# Patient Record
Sex: FEMALE | Race: WHITE | NOT HISPANIC OR LATINO | Employment: FULL TIME | URBAN - METROPOLITAN AREA
[De-identification: names, ages, dates, MRNs, and addresses within clinical notes are randomized per-mention and may not be internally consistent; named-entity substitution may affect disease eponyms.]

---

## 2021-12-08 ENCOUNTER — OFFICE VISIT (OUTPATIENT)
Dept: URGENT CARE | Facility: CLINIC | Age: 46
End: 2021-12-08
Payer: COMMERCIAL

## 2021-12-08 VITALS
DIASTOLIC BLOOD PRESSURE: 82 MMHG | BODY MASS INDEX: 23.92 KG/M2 | TEMPERATURE: 97.8 F | OXYGEN SATURATION: 99 % | SYSTOLIC BLOOD PRESSURE: 122 MMHG | RESPIRATION RATE: 16 BRPM | HEART RATE: 74 BPM | HEIGHT: 62 IN | WEIGHT: 130 LBS

## 2021-12-08 DIAGNOSIS — S29.019A ACUTE THORACIC MYOFASCIAL STRAIN, INITIAL ENCOUNTER: Primary | ICD-10-CM

## 2021-12-08 DIAGNOSIS — M62.830 SPASM OF THORACIC BACK MUSCLE: ICD-10-CM

## 2021-12-08 DIAGNOSIS — M62.838 CERVICAL PARASPINAL MUSCLE SPASM: ICD-10-CM

## 2021-12-08 PROCEDURE — 99203 OFFICE O/P NEW LOW 30 MIN: CPT | Performed by: PHYSICIAN ASSISTANT

## 2021-12-08 RX ORDER — CYCLOBENZAPRINE HCL 10 MG
10 TABLET ORAL 3 TIMES DAILY PRN
Qty: 30 TABLET | Refills: 0 | Status: SHIPPED | OUTPATIENT
Start: 2021-12-08 | End: 2021-12-23 | Stop reason: SDUPTHER

## 2021-12-08 RX ORDER — TRAZODONE HYDROCHLORIDE 50 MG/1
50 TABLET ORAL NIGHTLY
COMMUNITY
End: 2022-12-13 | Stop reason: SDUPTHER

## 2021-12-08 RX ORDER — PREDNISONE 20 MG/1
20 TABLET ORAL 2 TIMES DAILY
Qty: 14 TABLET | Refills: 0 | Status: SHIPPED | OUTPATIENT
Start: 2021-12-08 | End: 2021-12-15

## 2021-12-08 ASSESSMENT — ENCOUNTER SYMPTOMS
FOCAL WEAKNESS: 0
PARESTHESIAS: 1
BOWEL INCONTINENCE: 0
MYALGIAS: 1
HEADACHES: 0
SENSORY CHANGE: 0
BACK PAIN: 1
TINGLING: 1
ABDOMINAL PAIN: 0
FEVER: 0
NECK PAIN: 1

## 2021-12-09 NOTE — PROGRESS NOTES
Subjective     Tari Adam is a 46 y.o. female who presents with Back Pain    Medications:    • CYMBALTA PO  • SYMBICORT INH  • traZODone Tabs    Allergies: Patient has no known allergies.    Problem List: Tari Adam does not have a problem list on file.    Surgical History:  No past surgical history on file.    Past Social Hx: Tari Adam  reports that she has never smoked. She has never used smokeless tobacco.     Past Family Hx:  Tari Adam family history is not on file.     Problem list, medications, and allergies reviewed by myself today in Epic.          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  PT states she felt pain at the time but has gotten progressively worse over the last 8. Days.  Injury occurred 11/30/2021.  Pt has taken otc nsaids and flexeril (she had some at home) with some improvement but very little.  Pt has some occasional paresthesia to bilateral upper extremities with certain movements.  Pt has informed her supervisor at the [facility] of this injury.  Pt was instructed to be seen, UC was ok, and then to have paperwork submitted to supervisor.  No previous back injury in the past.     PT refused work comp pathway,stated she was instructed to be seen in UC as a regular patient, not work comp.       Back Pain  This is a new problem. The current episode started 1 to 4 weeks ago. The problem occurs constantly. The problem is unchanged. The pain is present in the thoracic spine. The quality of the pain is described as burning and cramping. The pain is at a severity of 4/10. The pain is moderate. The pain is the same all the time. The symptoms are aggravated by bending, position, standing and twisting. Stiffness is present all day. Associated symptoms include paresthesias and tingling. Pertinent negatives include no abdominal pain, bladder incontinence, bowel incontinence, fever or headaches. She has  "tried heat, home exercises, NSAIDs and muscle relaxant for the symptoms. The treatment provided no relief.       Review of Systems   Constitutional: Negative for fever.   Gastrointestinal: Negative for abdominal pain and bowel incontinence.   Genitourinary: Negative for bladder incontinence.   Musculoskeletal: Positive for back pain, myalgias and neck pain.   Neurological: Positive for tingling and paresthesias. Negative for sensory change, focal weakness and headaches.   All other systems reviewed and are negative.             Objective     /82 (BP Location: Left arm, Patient Position: Sitting, BP Cuff Size: Adult)   Pulse 74   Temp 36.6 °C (97.8 °F) (Temporal)   Resp 16   Ht 1.575 m (5' 2\")   Wt 59 kg (130 lb)   SpO2 99%   BMI 23.78 kg/m²      Physical Exam  Vitals and nursing note reviewed.   Constitutional:       General: She is not in acute distress.     Appearance: Normal appearance. She is well-developed and normal weight. She is not ill-appearing or toxic-appearing.   HENT:      Head: Normocephalic and atraumatic.      Right Ear: Tympanic membrane normal.      Left Ear: Tympanic membrane normal.      Nose: Nose normal.      Mouth/Throat:      Lips: Pink.      Mouth: Mucous membranes are moist.      Pharynx: Oropharynx is clear. Uvula midline.   Eyes:      Extraocular Movements: Extraocular movements intact.      Conjunctiva/sclera: Conjunctivae normal.      Pupils: Pupils are equal, round, and reactive to light.   Cardiovascular:      Rate and Rhythm: Normal rate and regular rhythm.      Pulses: Normal pulses.      Heart sounds: Normal heart sounds.   Pulmonary:      Effort: Pulmonary effort is normal.      Breath sounds: Normal breath sounds.   Abdominal:      General: Bowel sounds are normal.      Palpations: Abdomen is soft.      Tenderness: There is no guarding.   Musculoskeletal:      Cervical back: Neck supple. Spasms and tenderness present. No bony tenderness. Pain with movement present. " Decreased range of motion.      Thoracic back: Spasms and tenderness present. No bony tenderness. Decreased range of motion.        Back:    Skin:     General: Skin is warm and dry.      Capillary Refill: Capillary refill takes less than 2 seconds.   Neurological:      General: No focal deficit present.      Mental Status: She is alert and oriented to person, place, and time.      Cranial Nerves: No cranial nerve deficit.      Motor: Motor function is intact. No abnormal muscle tone.      Coordination: Coordination is intact. Coordination normal.      Gait: Gait normal.      Deep Tendon Reflexes: Reflexes are normal and symmetric.   Psychiatric:         Mood and Affect: Mood normal.         Behavior: Behavior is cooperative.                   Assessment & Plan        1. Acute thoracic myofascial strain, initial encounter  cyclobenzaprine (FLEXERIL) 10 mg Tab    predniSONE (DELTASONE) 20 MG Tab   2. Spasm of thoracic back muscle  cyclobenzaprine (FLEXERIL) 10 mg Tab    predniSONE (DELTASONE) 20 MG Tab   3. Cervical paraspinal muscle spasm  cyclobenzaprine (FLEXERIL) 10 mg Tab    predniSONE (DELTASONE) 20 MG Tab     Patient was evaluated in clinic today while wearing appropriate personal protective equipment.      PT to begin prescription medications today as discussed.  PT instructed not to drive or operate heavy machinery or drink alcohol while taking this medication because it contains either a narcotic, benzodiazepines or muscle relaxant which causes drowsiness. PT verbalized understanding of these instructions.     [facility] Aware web site evaluation: I have obtained and reviewed patient utilization report from [facility] pharmacy database prior to writing prescription for controlled substance.  No history of abuse.    PT should follow up with PCP in 1-2 days for re-evaluation if symptoms have not improved.      Discussed red flags and reasons to return to  or ED.      Pt and/or family verbalized  understanding of diagnosis and follow up instructions and was offered informational handout on diagnosis.  PT discharged.

## 2021-12-13 ENCOUNTER — OFFICE VISIT (OUTPATIENT)
Dept: URGENT CARE | Facility: CLINIC | Age: 46
End: 2021-12-13
Payer: COMMERCIAL

## 2021-12-13 VITALS
DIASTOLIC BLOOD PRESSURE: 70 MMHG | HEART RATE: 79 BPM | BODY MASS INDEX: 23.92 KG/M2 | HEIGHT: 62 IN | SYSTOLIC BLOOD PRESSURE: 110 MMHG | RESPIRATION RATE: 16 BRPM | TEMPERATURE: 98.9 F | WEIGHT: 130 LBS | OXYGEN SATURATION: 98 %

## 2021-12-13 DIAGNOSIS — B37.9 YEAST INFECTION: ICD-10-CM

## 2021-12-13 PROCEDURE — 99213 OFFICE O/P EST LOW 20 MIN: CPT | Performed by: NURSE PRACTITIONER

## 2021-12-13 RX ORDER — FLUCONAZOLE 150 MG/1
150 TABLET ORAL
Qty: 2 TABLET | Refills: 0 | Status: SHIPPED | OUTPATIENT
Start: 2021-12-13 | End: 2021-12-17

## 2021-12-13 ASSESSMENT — ENCOUNTER SYMPTOMS
CHILLS: 0
FEVER: 0
FLANK PAIN: 0

## 2021-12-13 NOTE — PROGRESS NOTES
"Subjective     Tari Adam is a 46 y.o. female who presents with Vaginitis (took a 1 day monostat help but not all the way)            Tari comes in today with a 3-5 day history of vaginal itching with thick white vaginal discharge.  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  She took OTC monistat and symptoms improved but did not resolve.  She denies any fever, chills, myalgias, dysuria, flank pain, pelvic pain, genital rash or lesion.  No suspected STI.  Status post hysterectomy.        Review of Systems   Constitutional: Negative for chills, fever and malaise/fatigue.   Genitourinary: Negative for dysuria, flank pain, frequency, hematuria and urgency.   Skin: Positive for itching.     Medications, Allergies, and current problem list reviewed today in Epic         Objective     Blood Pressure 110/70   Pulse 79   Temperature 37.2 °C (98.9 °F) (Temporal)   Respiration 16   Height 1.575 m (5' 2\")   Weight 59 kg (130 lb)   Oxygen Saturation 98%   Body Mass Index 23.78 kg/m²      Physical Exam  Vitals reviewed.   Constitutional:       General: She is not in acute distress.     Appearance: Normal appearance. She is not ill-appearing.   Eyes:      General: No scleral icterus.  Cardiovascular:      Rate and Rhythm: Normal rate and regular rhythm.      Heart sounds: Normal heart sounds. No murmur heard.  No friction rub. No gallop.    Pulmonary:      Effort: Pulmonary effort is normal. No respiratory distress.      Breath sounds: Normal breath sounds. No stridor. No wheezing, rhonchi or rales.   Chest:      Chest wall: No tenderness.   Abdominal:      General: Abdomen is flat. There is no distension.      Tenderness: There is no abdominal tenderness.   Genitourinary:     Comments: Tari declined pelvic exam or vaginal pathogens testing at this time.    Skin:     General: Skin is warm and dry.      Coloration: Skin is not jaundiced or pale.   Neurological:      Mental Status: She is " alert and oriented to person, place, and time.   Psychiatric:         Mood and Affect: Mood normal.                             Assessment & Plan        1. Yeast infection  - fluconazole (DIFLUCAN) 150 MG tablet; Take 1 Tablet by mouth every 3 days for 2 doses.  Dispense: 2 Tablet; Refill: 0    Discussed exam findings with Tari.  Will treat for presumptive yeast infection. Differential reviewed.  Diflucan as prescribed.  Maintain adequate po hydration.  Follow up in 1 week if symptoms persist sooner if worse.  She verbalized understanding of and agreed with plan of care.

## 2021-12-23 ENCOUNTER — OFFICE VISIT (OUTPATIENT)
Dept: MEDICAL GROUP | Facility: PHYSICIAN GROUP | Age: 46
End: 2021-12-23
Payer: COMMERCIAL

## 2021-12-23 ENCOUNTER — TELEPHONE (OUTPATIENT)
Dept: MEDICAL GROUP | Facility: PHYSICIAN GROUP | Age: 46
End: 2021-12-23

## 2021-12-23 VITALS
RESPIRATION RATE: 16 BRPM | BODY MASS INDEX: 24.18 KG/M2 | WEIGHT: 131.4 LBS | OXYGEN SATURATION: 98 % | SYSTOLIC BLOOD PRESSURE: 118 MMHG | HEART RATE: 73 BPM | DIASTOLIC BLOOD PRESSURE: 60 MMHG | TEMPERATURE: 98.3 F | HEIGHT: 62 IN

## 2021-12-23 DIAGNOSIS — S29.019D ACUTE THORACIC MYOFASCIAL STRAIN, SUBSEQUENT ENCOUNTER: ICD-10-CM

## 2021-12-23 DIAGNOSIS — J45.40 MODERATE PERSISTENT REACTIVE AIRWAY DISEASE WITHOUT COMPLICATION: ICD-10-CM

## 2021-12-23 DIAGNOSIS — Z90.2 HISTORY OF LOBECTOMY OF LUNG: ICD-10-CM

## 2021-12-23 DIAGNOSIS — Z12.31 ENCOUNTER FOR SCREENING MAMMOGRAM FOR MALIGNANT NEOPLASM OF BREAST: ICD-10-CM

## 2021-12-23 DIAGNOSIS — Z12.11 SCREENING FOR COLON CANCER: ICD-10-CM

## 2021-12-23 DIAGNOSIS — Z86.19 HISTORY OF COLD SORES: ICD-10-CM

## 2021-12-23 DIAGNOSIS — Z23 NEED FOR VACCINATION: ICD-10-CM

## 2021-12-23 DIAGNOSIS — F33.41 RECURRENT MAJOR DEPRESSIVE DISORDER, IN PARTIAL REMISSION (HCC): ICD-10-CM

## 2021-12-23 DIAGNOSIS — M62.838 CERVICAL PARASPINAL MUSCLE SPASM: ICD-10-CM

## 2021-12-23 DIAGNOSIS — M62.830 SPASM OF THORACIC BACK MUSCLE: ICD-10-CM

## 2021-12-23 DIAGNOSIS — Z00.00 HEALTHCARE MAINTENANCE: ICD-10-CM

## 2021-12-23 PROBLEM — J45.909 REACTIVE AIRWAY DISEASE WITHOUT COMPLICATION: Status: ACTIVE | Noted: 2021-12-23

## 2021-12-23 PROCEDURE — 99214 OFFICE O/P EST MOD 30 MIN: CPT | Mod: 25 | Performed by: STUDENT IN AN ORGANIZED HEALTH CARE EDUCATION/TRAINING PROGRAM

## 2021-12-23 PROCEDURE — 90471 IMMUNIZATION ADMIN: CPT | Performed by: STUDENT IN AN ORGANIZED HEALTH CARE EDUCATION/TRAINING PROGRAM

## 2021-12-23 PROCEDURE — 90732 PPSV23 VACC 2 YRS+ SUBQ/IM: CPT | Performed by: STUDENT IN AN ORGANIZED HEALTH CARE EDUCATION/TRAINING PROGRAM

## 2021-12-23 RX ORDER — DULOXETIN HYDROCHLORIDE 20 MG/1
40 CAPSULE, DELAYED RELEASE ORAL DAILY
Qty: 180 CAPSULE | Refills: 3 | Status: SHIPPED | OUTPATIENT
Start: 2021-12-23 | End: 2022-08-08 | Stop reason: SDUPTHER

## 2021-12-23 RX ORDER — CYCLOBENZAPRINE HCL 10 MG
10 TABLET ORAL 3 TIMES DAILY PRN
Qty: 30 TABLET | Refills: 0 | Status: SHIPPED | OUTPATIENT
Start: 2021-12-23 | End: 2022-09-27 | Stop reason: SDUPTHER

## 2021-12-23 RX ORDER — BUDESONIDE AND FORMOTEROL FUMARATE DIHYDRATE 80; 4.5 UG/1; UG/1
2 AEROSOL RESPIRATORY (INHALATION) DAILY
Qty: 1 EACH | Refills: 11 | Status: SHIPPED | OUTPATIENT
Start: 2021-12-23 | End: 2022-09-27 | Stop reason: SDUPTHER

## 2021-12-23 RX ORDER — GABAPENTIN 300 MG/1
300 CAPSULE ORAL 3 TIMES DAILY
COMMUNITY
End: 2022-09-27

## 2021-12-23 RX ORDER — VALGANCICLOVIR 450 MG/1
500 TABLET, FILM COATED ORAL DAILY
COMMUNITY
End: 2021-12-23 | Stop reason: SDUPTHER

## 2021-12-23 RX ORDER — ONDANSETRON HYDROCHLORIDE 4 MG/5ML
4 SOLUTION ORAL EVERY 6 HOURS PRN
COMMUNITY
End: 2022-09-27

## 2021-12-23 RX ORDER — VALGANCICLOVIR 450 MG/1
TABLET, FILM COATED ORAL
Qty: 20 TABLET | Refills: 0 | Status: SHIPPED | OUTPATIENT
Start: 2021-12-23 | End: 2022-06-20 | Stop reason: SDUPTHER

## 2021-12-23 ASSESSMENT — PATIENT HEALTH QUESTIONNAIRE - PHQ9: CLINICAL INTERPRETATION OF PHQ2 SCORE: 0

## 2021-12-23 NOTE — TELEPHONE ENCOUNTER
MEDICATION PRIOR AUTHORIZATION NEEDED:    1. Name of Medication: VALGANCICLOVIR     2. Requested By (Name of Pharmacy): [facility]      3. Is insurance on file current? YES     4. What is the name & phone number of the 3rd party payor? [employer]EE[employer]     DOCUMENTATION OF PAR STATUS:    1. Name of Medication & Dose: VALGANCICLOVIR      2. Name of Prescription Coverage Company & phone #: [employer]    3. Date Prior Auth Submitted: 12/23/2021    4. What information was given to obtain insurance decision? Pt media     5. Prior Auth Status? Pending    6. Patient Notified: N\A

## 2021-12-23 NOTE — PROGRESS NOTES
Subjective:     CC:  Diagnoses of Acute thoracic myofascial strain, subsequent encounter, Spasm of thoracic back muscle, Cervical paraspinal muscle spasm, Recurrent major depressive disorder, in partial remission (HCC), Need for vaccination, Encounter for screening mammogram for malignant neoplasm of breast, Screening for colon cancer, Healthcare maintenance, History of lobectomy of lung, and Moderate persistent reactive airway disease without complication were pertinent to this visit.    HISTORY OF THE PRESENT ILLNESS: Patient is a 46 y.o. female. This pleasant patient is here today to establish care and discuss multiple issues. Her prior PCP was Ping Saunders MD in [place].    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  She has a history of colon polyps and is on a 5-year schedule for colonoscopy.  She has a history of left lower lobectomy secondary to recurrent pneumothorax.  Status post partial hysterectomy, uterus and cervix removed.  She is up-to-date on vaccinations with exception of pneumococcal vaccine.  She will provide us with these records.    1.  Acute thoracic myofascial strain, subsequent encounter/spasm of thoracic back muscle/cervical paraspinal muscle spasm.  This injury initially occurred on November 19 of this year.  The circumstance was lifting 102 kg patient.  The pain is located paraspinal from the base of the skull down to the mid thoracic region.  She is currently finishing a course of prednisone prescribed for her at urgent care this offered only mild relief.  She continues to use cyclobenzaprine 1 tablet before going to bed so that she is not excessively stiff when she wakes up.  This has seemed to be helpful.  She is current currently using ibuprofen.    2.  Recurrent major depressive disorder, in partial remission (HCC)  Longstanding.  Patient is treated with duloxetine 40 mg daily.  No side effects to treatment.  She feels that her symptoms are well  controlled.  She does see counseling.    3.  Reactive airway disease/left lower lobectomy  Patient had no history of reactive airway disease until after she had a left lower lobectomy for recurrent pneumothorax.  She has had some reactive airway type disease since then she treats this with Symbicort 80-4.52 puffs daily.  This seems to adequately control her symptoms.  She does have some occasional pain at the site of her surgical incision, left lateral thoracotomy, she treats this with gabapentin as needed.    Active Diagnosis:    Patient Active Problem List   Diagnosis   • Recurrent major depressive disorder, in partial remission (HCC)   • Reactive airway disease without complication   • History of lobectomy of lung          Current Outpatient Medications Ordered in Epic   Medication Sig Dispense Refill   • NS SOLN 60 mL with albuterol 2.5 mg/0.5 mL NEBU 100 mg Take 100 mg/hr by nebulization.     • gabapentin (NEURONTIN) 300 MG Cap Take 300 mg by mouth 3 times a day.     • ondansetron (ZOFRAN) 4 MG/5ML oral solution Take 4 mg by mouth every 6 hours as needed for Nausea.     • cyclobenzaprine (FLEXERIL) 10 mg Tab Take 1 Tablet by mouth 3 times a day as needed. 30 Tablet 0   • DULoxetine (CYMBALTA) 20 MG Cap DR Particles Take 2 Capsules by mouth every day. 180 Capsule 3   • budesonide-formoterol (SYMBICORT) 80-4.5 MCG/ACT Aerosol Inhale 2 Puffs every day. 1 Each 11   • valGANciclovir (VALCYTE) 450 MG tablet Take one tablet twice daily for five days at onset of symptoms. 20 Tablet 0   • traZODone (DESYREL) 50 MG Tab Take 50 mg by mouth every evening.       No current Epic-ordered facility-administered medications on file.     ROS:   Gen: No fevers/chills, no changes in weight  HEENT: No changes in vision/hearing, sore throat.  Pulm: No cough, unexplained SOB.  CV: No chest pain/pressure, no palpitations  GI: No nausea/vomiting, no diarrhea  : No dysuria/nocturia  MSk: See HPI.  Skin: No rash/skin changes  Neuro: No  "headaches, no numbness/tingling  Heme/Lymph: no easy bruising      Objective:     Exam: /60 (BP Location: Left arm, Patient Position: Sitting, BP Cuff Size: Adult)   Pulse 73   Temp 36.8 °C (98.3 °F) (Temporal)   Resp 16   Ht 1.575 m (5' 2\")   Wt 59.6 kg (131 lb 6.4 oz)   SpO2 98%  Body mass index is 24.03 kg/m².    General: Normal appearing. No distress.  HEENT: Normocephalic. Eyes conjunctiva clear lids without ptosis, pupils equal and reactive to light accommodation. Ears normal shape and contour, canals are clear bilaterally, tympanic membranes are benign, nasal mucosa benign, oropharynx is without erythema, edema or exudates.   Neck: Supple without JVD or bruit. Thyroid is not enlarged.  Pulmonary: Clear to ausculation.  Left lower lobe greatly diminished.  Normal effort. No rales, ronchi, or wheezing.  Cardiovascular: Regular rate and rhythm without murmur. Radial pulses are intact and equal bilaterally.  Abdomen: Nondistended.  Neurologic: Grossly nonfocal.  CN II through XII intact.  Lymph: No cervical or supraclavicular lymph nodes are palpable  Skin: Warm and dry.  No obvious lesions.  Musculoskeletal: Normal gait. No extremity cyanosis, clubbing, or edema.  Modest cervical paraspinal tenderness.  No excessive tension of the paraspinal muscles of the cervical or thoracic region.  No deformity noted.  Normal active/passive ROM of the cervical and thoracic spine.  Psych: Normal mood and affect. Alert and oriented x3. Judgment and insight are normal.    A chaperone was offered to the patient during today's exam. Patient declined chaperone.    Labs:   No labs available    Assessment & Plan:   46 y.o. female with the following -    1.  Acute thoracic myofascial strain, subsequent encounter/spasm of thoracic back muscle/cervical paraspinal muscle spasm.  -Acute, stable.  -Continue cyclobenzaprine 10 mg up to 3 times daily as needed.  -Ibuprofen 100 mg 3 times daily as needed.  -We have reviewed " stretches in the office today I have advised her to use these multiple times per day.  She does also engage in yoga.  -I have recommended massage if this helps.    2.  Recurrent major depressive disorder, in partial remission (HCC)  -Chronic, stable.  -Continue with psychotherapy.  -Duloxetine hydrochloride 40 mg daily.  Dispense 90.  Refill 3.    3.  Active airway disease/left lower lobectomy  -Chronic, stable.  -Symbicort 80-4.52 puffs daily.  -Gabapentin 300 mg up to 3 times daily as needed for incision site pain.    4. Healthcare maintenance  -We discussed diet/exercise/healthy weight maintenance.  Patient has a reasonably good regimen.  We discussed the need for biannual dentistry visits and the need to always wear a seatbelt.  -Pneumococcal vaccine provided in clinic today.  -Refer to GI for colonoscopy  -Mammogram  -CBC, CMP, lipid profile.    Return in about 1 year (around 12/23/2022).    Please note that this dictation was created using voice recognition software. I have made every reasonable attempt to correct obvious errors, but I expect that there are errors of grammar and possibly content that I did not discover before finalizing the note.      Eh Alvarado PA-C 12/23/2021

## 2021-12-23 NOTE — LETTER
[facility]  Eh Alvarado P.A.-C.  [address]  [place] [address]  Fax: [phone]   Authorization for Release/Disclosure of   Protected Health Information   Name: MARTIN CASTILLO : 1975 SSN: [id number]   Address: [facility] [address]  [place] [address] Phone:    [phone] (home)    I authorize the entity listed below to release/disclose the PHI below to:   [facility]/Eh Alvarado P.A.-C. and Eh Alvarado P.A.-C.   Provider or Entity Name:  Ping Hassan MD    Address   [facility], [address] [place] [address] Phone:  [phone]    Fax:     Reason for request: continuity of care   Information to be released:    [  ] LAST COLONOSCOPY,  including any PATH REPORT and follow-up  [  ] LAST FIT/COLOGUARD RESULT [  ] LAST DEXA  [  ] LAST MAMMOGRAM  [  ] LAST PAP  [  ] LAST LABS [  ] RETINA EXAM REPORT  [  ] IMMUNIZATION RECORDS  [  ] Release all info      [  ] Check here and initial the line next to each item to release ALL health information INCLUDING  _____ Care and treatment for drug and / or alcohol abuse  _____ HIV testing, infection status, or AIDS  _____ Genetic Testing    DATES OF SERVICE OR TIME PERIOD TO BE DISCLOSED: _____________  I understand and acknowledge that:  * This Authorization may be revoked at any time by you in writing, except if your health information has already been used or disclosed.  * Your health information that will be used or disclosed as a result of you signing this authorization could be re-disclosed by the recipient. If this occurs, your re-disclosed health information may no longer be protected by State or Federal laws.  * You may refuse to sign this Authorization. Your refusal will not affect your ability to obtain treatment.  * This Authorization becomes effective upon signing and will  on (date) __________.      If no date is indicated, this Authorization will  one (1) year from the signature date.     Name: Tari Adam    Signature:   Date:     12/23/2021       PLEASE FAX REQUESTED RECORDS BACK TO: [phone]

## 2021-12-28 ENCOUNTER — TELEPHONE (OUTPATIENT)
Dept: MEDICAL GROUP | Facility: PHYSICIAN GROUP | Age: 46
End: 2021-12-28

## 2021-12-28 NOTE — TELEPHONE ENCOUNTER
"FINAL PRIOR AUTHORIZATION STATUS:    1.  Name of Medication & Dose: Valcyte      2. Prior Auth Status: Denied.  Reason: \"The diagnosis of acute thoracic myofascial strain is considered an experimental or investigational use for this drug or product.\"     3. Action Taken: Pharmacy Notified: no Patient Notified: no  "

## 2022-02-10 ENCOUNTER — OFFICE VISIT (OUTPATIENT)
Dept: MEDICAL GROUP | Facility: PHYSICIAN GROUP | Age: 47
End: 2022-02-10
Payer: COMMERCIAL

## 2022-02-10 VITALS
HEART RATE: 89 BPM | WEIGHT: 129.6 LBS | HEIGHT: 62 IN | DIASTOLIC BLOOD PRESSURE: 60 MMHG | SYSTOLIC BLOOD PRESSURE: 120 MMHG | TEMPERATURE: 98.1 F | OXYGEN SATURATION: 98 % | BODY MASS INDEX: 23.85 KG/M2 | RESPIRATION RATE: 16 BRPM

## 2022-02-10 DIAGNOSIS — G47.20 SLEEP-WAKE DISORDER: ICD-10-CM

## 2022-02-10 DIAGNOSIS — G47.26 SHIFT WORK SLEEP DISORDER: ICD-10-CM

## 2022-02-10 PROCEDURE — 99213 OFFICE O/P EST LOW 20 MIN: CPT | Performed by: STUDENT IN AN ORGANIZED HEALTH CARE EDUCATION/TRAINING PROGRAM

## 2022-02-10 RX ORDER — FLUCONAZOLE 150 MG/1
TABLET ORAL
Qty: 20 TABLET | Refills: 0 | Status: SHIPPED | OUTPATIENT
Start: 2022-02-10

## 2022-02-10 RX ORDER — MODAFINIL 100 MG/1
TABLET ORAL
Qty: 70 TABLET | Refills: 0 | Status: SHIPPED | OUTPATIENT
Start: 2022-02-10 | End: 2022-05-10

## 2022-02-11 NOTE — PROGRESS NOTES
CC:  Diagnoses of Sleep-wake disorder and Shift work sleep disorder were pertinent to this visit.    HISTORY OF THE PRESENT ILLNESS: Patient is a 46 y.o. female. 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    Ms. Adam has been required by her employer to cycle back and forth between day and night shift.  She has found great difficulty in regulating this.  In the past she has use modafinil 100 mg preshift with great success.  She is here to discuss whether or not that would be appropriate for her moving forward.    No problem-specific Assessment & Plan notes found for this encounter.      Current Outpatient Medications Ordered in Epic   Medication Sig Dispense Refill   • modafinil (PROVIGIL) 100 MG Tab Take one tablet 1 hour before each night shift. 70 Tablet 0   • fluconazole (DIFLUCAN) 150 MG tablet Use two tablets three days apart for symptoms. 20 Tablet 0   • NS SOLN 60 mL with albuterol 2.5 mg/0.5 mL NEBU 100 mg Take 100 mg/hr by nebulization.     • gabapentin (NEURONTIN) 300 MG Cap Take 300 mg by mouth 3 times a day.     • ondansetron (ZOFRAN) 4 MG/5ML oral solution Take 4 mg by mouth every 6 hours as needed for Nausea.     • cyclobenzaprine (FLEXERIL) 10 mg Tab Take 1 Tablet by mouth 3 times a day as needed. 30 Tablet 0   • DULoxetine (CYMBALTA) 20 MG Cap DR Particles Take 2 Capsules by mouth every day. 180 Capsule 3   • budesonide-formoterol (SYMBICORT) 80-4.5 MCG/ACT Aerosol Inhale 2 Puffs every day. 1 Each 11   • valGANciclovir (VALCYTE) 450 MG tablet Take one tablet twice daily for five days at onset of symptoms. 20 Tablet 0   • traZODone (DESYREL) 50 MG Tab Take 50 mg by mouth every evening.       No current Epic-ordered facility-administered medications on file.     ROS:   Gen: no fevers/chills, unplanned changes in weight  Eyes: no changes in vision  ENT: no sore throat, no hearing loss, no bloody  "nose  Pulm: no sob, no cough  CV: no chest pain/pressure, no palpitations  GI: no nausea/vomiting, no diarrhea  : no dysuria/nocturia > once per night  MSk: no myalgias  Skin: no rash  Neuro: no headaches, no numbness/tingling  Heme/Lymph: no easy bruising      Objective:     Exam: /60 (BP Location: Left arm, Patient Position: Sitting, BP Cuff Size: Adult)   Pulse 89   Temp 36.7 °C (98.1 °F) (Temporal)   Resp 16   Ht 1.575 m (5' 2\")   Wt 58.8 kg (129 lb 9.6 oz)   SpO2 98%  Body mass index is 23.7 kg/m².    General: Normal appearing. No distress.  Neurologic: Grossly nonfocal  Skin: Warm and dry.  No obvious lesions.  Psych: Normal mood and affect. Alert and oriented x3. Judgment and insight is normal.    A chaperone was offered to the patient during today's exam. Patient declined chaperone.    Labs:   No pertinent labs.    Assessment & Plan:   46 y.o. female with the following -    1.  Shiftwork sleep disorder  -Chronic.  -PDMP reviewed.  -Modafinil 100 mg to be taken 1 hour before shift.  Dispense 70.  Refill 0.  This represents a 90-day supply.    Return if symptoms worsen or fail to improve.    Please note that this dictation was created using voice recognition software. I have made every reasonable attempt to correct obvious errors, but I expect that there are errors of grammar and possibly content that I did not discover before finalizing the note.    Eh Alvarado PA-C 2/10/2022  "

## 2022-02-14 ENCOUNTER — TELEPHONE (OUTPATIENT)
Dept: MEDICAL GROUP | Facility: PHYSICIAN GROUP | Age: 47
End: 2022-02-14

## 2022-02-14 NOTE — TELEPHONE ENCOUNTER
"VOICEMAIL  1. Caller Name: [facility]                      Call Back Number: [phone]    2. Message: [facility]'s pharmacy called on behalf of pt medication directions as they are \"confused.\" [facility]'s pharmacy sated for the Modafinil the directions were \"Two tablets 3 days apart for symptoms.\" [facility]'s pharmacy is requesting this be fixed and the directions be more clear.     3. Patient approves office to leave a detailed voicemail/MyChart message: N\A    "

## 2022-06-13 DIAGNOSIS — G47.26 SHIFT WORK SLEEP DISORDER: ICD-10-CM

## 2022-06-14 RX ORDER — MODAFINIL 100 MG/1
TABLET ORAL
Qty: 70 TABLET | Refills: 0 | OUTPATIENT
Start: 2022-06-14 | End: 2022-09-10

## 2022-06-20 RX ORDER — VALGANCICLOVIR 450 MG/1
TABLET, FILM COATED ORAL
Qty: 20 TABLET | Refills: 0 | Status: SHIPPED | OUTPATIENT
Start: 2022-06-20 | End: 2022-06-21

## 2022-06-21 RX ORDER — VALACYCLOVIR HYDROCHLORIDE 500 MG/1
500 TABLET, FILM COATED ORAL 2 TIMES DAILY
Qty: 40 TABLET | Refills: 0 | Status: SHIPPED | OUTPATIENT
Start: 2022-06-21 | End: 2022-12-13 | Stop reason: SDUPTHER

## 2022-08-08 RX ORDER — DULOXETIN HYDROCHLORIDE 20 MG/1
40 CAPSULE, DELAYED RELEASE ORAL DAILY
Qty: 180 CAPSULE | Refills: 3 | Status: SHIPPED | OUTPATIENT
Start: 2022-08-08

## 2022-09-27 ENCOUNTER — TELEMEDICINE (OUTPATIENT)
Dept: MEDICAL GROUP | Facility: PHYSICIAN GROUP | Age: 47
End: 2022-09-27
Payer: COMMERCIAL

## 2022-09-27 VITALS — BODY MASS INDEX: 23.55 KG/M2 | WEIGHT: 128 LBS | HEIGHT: 62 IN | TEMPERATURE: 97.9 F

## 2022-09-27 DIAGNOSIS — M62.838 CERVICAL PARASPINAL MUSCLE SPASM: ICD-10-CM

## 2022-09-27 DIAGNOSIS — G47.26 SHIFT WORK SLEEP DISORDER: Primary | ICD-10-CM

## 2022-09-27 DIAGNOSIS — N89.8 VAGINAL DISCHARGE: ICD-10-CM

## 2022-09-27 DIAGNOSIS — S29.019D ACUTE THORACIC MYOFASCIAL STRAIN, SUBSEQUENT ENCOUNTER: ICD-10-CM

## 2022-09-27 DIAGNOSIS — M62.830 SPASM OF THORACIC BACK MUSCLE: ICD-10-CM

## 2022-09-27 PROCEDURE — 99214 OFFICE O/P EST MOD 30 MIN: CPT | Mod: 95 | Performed by: FAMILY MEDICINE

## 2022-09-27 RX ORDER — ALBUTEROL SULFATE 90 UG/1
2 AEROSOL, METERED RESPIRATORY (INHALATION) EVERY 4 HOURS PRN
Qty: 8.5 G | Refills: 0 | Status: SHIPPED | OUTPATIENT
Start: 2022-09-27

## 2022-09-27 RX ORDER — BUDESONIDE AND FORMOTEROL FUMARATE DIHYDRATE 80; 4.5 UG/1; UG/1
2 AEROSOL RESPIRATORY (INHALATION) DAILY
Qty: 1 EACH | Refills: 0 | Status: SHIPPED | OUTPATIENT
Start: 2022-09-27

## 2022-09-27 RX ORDER — CYCLOBENZAPRINE HCL 10 MG
10 TABLET ORAL 3 TIMES DAILY PRN
Qty: 30 TABLET | Refills: 0 | Status: SHIPPED | OUTPATIENT
Start: 2022-09-27

## 2022-09-27 RX ORDER — MODAFINIL 100 MG/1
100 TABLET ORAL DAILY
Qty: 30 TABLET | Refills: 0 | Status: SHIPPED | OUTPATIENT
Start: 2022-09-27 | End: 2022-10-27

## 2022-09-27 ASSESSMENT — PATIENT HEALTH QUESTIONNAIRE - PHQ9
3. TROUBLE FALLING OR STAYING ASLEEP OR SLEEPING TOO MUCH: NOT AT ALL
1. LITTLE INTEREST OR PLEASURE IN DOING THINGS: NOT AT ALL
5. POOR APPETITE OR OVEREATING: NOT AT ALL
4. FEELING TIRED OR HAVING LITTLE ENERGY: NOT AT ALL
8. MOVING OR SPEAKING SO SLOWLY THAT OTHER PEOPLE COULD HAVE NOTICED. OR THE OPPOSITE, BEING SO FIGETY OR RESTLESS THAT YOU HAVE BEEN MOVING AROUND A LOT MORE THAN USUAL: NOT AT ALL
SUM OF ALL RESPONSES TO PHQ QUESTIONS 1-9: 0
SUM OF ALL RESPONSES TO PHQ9 QUESTIONS 1 AND 2: 0
9. THOUGHTS THAT YOU WOULD BE BETTER OFF DEAD, OR OF HURTING YOURSELF: NOT AT ALL
6. FEELING BAD ABOUT YOURSELF - OR THAT YOU ARE A FAILURE OR HAVE LET YOURSELF OR YOUR FAMILY DOWN: NOT AL ALL
2. FEELING DOWN, DEPRESSED, IRRITABLE, OR HOPELESS: NOT AT ALL
7. TROUBLE CONCENTRATING ON THINGS, SUCH AS READING THE NEWSPAPER OR WATCHING TELEVISION: NOT AT ALL

## 2022-09-27 NOTE — PROGRESS NOTES
Virtual Visit: Established Patient   This visit was conducted via Zoom using secure and encrypted videoconferencing technology.   The patient was in a private location outside of their home in the state of [place].    The patient's identity was confirmed and verbal consent was obtained for this virtual visit.    Subjective:   CC:   Chief Complaint   Patient presents with    Medication Refill    Requesting Labs    Vaginal Discharge     Tari Adam is a 46 y.o. female presenting for evaluation and management of:    Problem   Vaginal Discharge    3 weeks  After trip to [place]  She took her fluconazole, but not resolved  Thick, white discharge  No itching     Shift Work Sleep Disorder    Chronic. Been on modafinil for a little bit over a year. No side effects. Currently using it for overnight shifts. She will be doing 3 nights/week + a 4th night shift every other week -- 14 shifts/month average. Dose doesn't last whole shift, so will take it 3 hrs into shift to be sure she is covered for the slump.    Last fill: 2/14/2022, #70, 0 tabs left             ROS   Gen: no fevers/chills  CV: no chest pain  Pulm: no shortness of breath    Current medicines (including changes today)  Current Outpatient Medications   Medication Sig Dispense Refill    modafinil (PROVIGIL) 100 MG Tab Take 1 Tablet by mouth every day for 30 days. 30 Tablet 0    albuterol 108 (90 Base) MCG/ACT Aero Soln inhalation aerosol Inhale 2 Puffs every four hours as needed for Shortness of Breath. 8.5 g 0    budesonide-formoterol (SYMBICORT) 80-4.5 MCG/ACT Aerosol Inhale 2 Puffs every day. 1 Each 0    cyclobenzaprine (FLEXERIL) 10 mg Tab Take 1 Tablet by mouth 3 times a day as needed for Muscle Spasms. 30 Tablet 0    ondansetron (ZOFRAN ODT) 4 MG TABLET DISPERSIBLE       DULoxetine (CYMBALTA) 20 MG Cap DR Particles Take 2 Capsules by mouth every day. 180 Capsule 3    valACYclovir (VALTREX) 500 MG Tab Take 1 Tablet by mouth 2 times a day. As prophylaxis on  "days with significant sun exposure. 40 Tablet 0    fluconazole (DIFLUCAN) 150 MG tablet Use two tablets three days apart for symptoms. 20 Tablet 0    NS SOLN 60 mL with albuterol 2.5 mg/0.5 mL NEBU 100 mg Take 100 mg/hr by nebulization.      traZODone (DESYREL) 50 MG Tab Take 50 mg by mouth every evening.       No current facility-administered medications for this visit.       Patient Active Problem List    Diagnosis Date Noted    Vaginal discharge 09/27/2022    Shift work sleep disorder 02/10/2022    Recurrent major depressive disorder, in partial remission (HCC) 12/23/2021    Reactive airway disease without complication 12/23/2021    History of lobectomy of lung 12/23/2021    History of cold sores 12/23/2021        Objective:   Temp 36.6 °C (97.9 °F) (Temporal) Comment: pt stated  Ht 1.575 m (5' 2\")   Wt 58.1 kg (128 lb) Comment: pt stated  BMI 23.41 kg/m²     Physical Exam:  Constitutional: Alert, no distress, well-groomed.  Skin: No rashes in visible areas.  Eye: Round. Conjunctiva clear, lids normal. No icterus.   ENMT: Lips pink without lesions, good dentition, moist mucous membranes. Phonation normal.  Neck: No masses, no thyromegaly. Moves freely without pain.  Respiratory: Unlabored respiratory effort, no cough or audible wheeze  Psych: Alert and oriented x3, normal affect and mood.     Assessment and Plan:   The following treatment plan was discussed:     1. Shift work sleep disorder  Chronic, stable.  She reports she has been on modafinil for overnight shifts for over a year.  She would like a refill today as she is changing to a mostly night shift schedule.  She is can have 3 night shifts per week plus an extra night shift every other week so average 14 night shifts per month.  There is no controlled substance treatment agreement on file.Obtained and reviewed patient utilization report from [facility] pharmacy database on 9/27/2022 2:13 PM  prior to writing prescription for controlled substance II, " III or IV per [place] bill . Based on assessment of the report, the prescription is medically necessary.   - modafinil (PROVIGIL) 100 MG Tab; Take 1 Tablet by mouth every day for 30 days.  Dispense: 30 Tablet; Refill: 0    2. Vaginal discharge  Acute.  For 3-week she has been having white, thick vaginal discharge without itching.  She did self medicate with her fluconazole but it did not resolve the symptoms so we will bring her back for an MA visit to get a BD affirm.  She also is interested in an STI panel as she recently broke up with her boyfriend and she wants to make sure is no STDs causing her symptoms either.  - Chlamydia/GC, PCR (Urine); Future  - T.PALLIDUM AB DMITRIY (SCREENING); Future  - HIV AG/AB COMBO ASSAY SCREENING; Future    3. Acute thoracic myofascial strain, subsequent encounter  4. Spasm of thoracic back muscle  5. 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  A refill has been provided.  - cyclobenzaprine (FLEXERIL) 10 mg Tab; Take 1 Tablet by mouth 3 times a day as needed for Muscle Spasms.  Dispense: 30 Tablet; Refill: 0    Follow-up: Return if symptoms worsen or fail to improve.

## 2022-10-06 ENCOUNTER — HOSPITAL ENCOUNTER (OUTPATIENT)
Dept: LAB | Facility: MEDICAL CENTER | Age: 47
End: 2022-10-06
Attending: FAMILY MEDICINE
Payer: COMMERCIAL

## 2022-10-06 ENCOUNTER — NON-PROVIDER VISIT (OUTPATIENT)
Dept: MEDICAL GROUP | Facility: PHYSICIAN GROUP | Age: 47
End: 2022-10-06
Payer: COMMERCIAL

## 2022-10-06 DIAGNOSIS — N89.8 VAGINAL DISCHARGE: ICD-10-CM

## 2022-10-06 LAB
AMBIGUOUS DTTM AMBI4: NORMAL
HIV 1+2 AB+HIV1 P24 AG SERPL QL IA: NORMAL
T PALLIDUM AB SER QL IA: NORMAL

## 2022-10-06 PROCEDURE — 86780 TREPONEMA PALLIDUM: CPT

## 2022-10-06 PROCEDURE — 87389 HIV-1 AG W/HIV-1&-2 AB AG IA: CPT

## 2022-10-06 PROCEDURE — 87491 CHLMYD TRACH DNA AMP PROBE: CPT

## 2022-10-06 PROCEDURE — 87510 GARDNER VAG DNA DIR PROBE: CPT

## 2022-10-06 PROCEDURE — 99999 PR NO CHARGE: CPT | Performed by: STUDENT IN AN ORGANIZED HEALTH CARE EDUCATION/TRAINING PROGRAM

## 2022-10-06 PROCEDURE — 36415 COLL VENOUS BLD VENIPUNCTURE: CPT

## 2022-10-06 PROCEDURE — 87480 CANDIDA DNA DIR PROBE: CPT

## 2022-10-06 PROCEDURE — 87591 N.GONORRHOEAE DNA AMP PROB: CPT

## 2022-10-06 PROCEDURE — 87660 TRICHOMONAS VAGIN DIR PROBE: CPT

## 2022-10-07 LAB
CANDIDA DNA VAG QL PROBE+SIG AMP: NEGATIVE
G VAGINALIS DNA VAG QL PROBE+SIG AMP: NEGATIVE
T VAGINALIS DNA VAG QL PROBE+SIG AMP: NEGATIVE

## 2022-10-13 LAB
C TRACH DNA SPEC QL NAA+PROBE: NEGATIVE
N GONORRHOEA DNA SPEC QL NAA+PROBE: NEGATIVE
SPECIMEN SOURCE: NORMAL

## 2022-11-12 ENCOUNTER — HOSPITAL ENCOUNTER (OUTPATIENT)
Dept: LAB | Facility: MEDICAL CENTER | Age: 47
End: 2022-11-12
Attending: STUDENT IN AN ORGANIZED HEALTH CARE EDUCATION/TRAINING PROGRAM
Payer: COMMERCIAL

## 2022-11-12 DIAGNOSIS — Z00.00 HEALTHCARE MAINTENANCE: ICD-10-CM

## 2022-11-12 LAB
ALBUMIN SERPL BCP-MCNC: 5 G/DL (ref 3.2–4.9)
ALBUMIN/GLOB SERPL: 1.9 G/DL
ALP SERPL-CCNC: 41 U/L (ref 30–99)
ALT SERPL-CCNC: 20 U/L (ref 2–50)
ANION GAP SERPL CALC-SCNC: 14 MMOL/L (ref 7–16)
AST SERPL-CCNC: 23 U/L (ref 12–45)
BILIRUB SERPL-MCNC: 0.8 MG/DL (ref 0.1–1.5)
BUN SERPL-MCNC: 14 MG/DL (ref 8–22)
CALCIUM SERPL-MCNC: 9.7 MG/DL (ref 8.5–10.5)
CHLORIDE SERPL-SCNC: 103 MMOL/L (ref 96–112)
CHOLEST SERPL-MCNC: 249 MG/DL (ref 100–199)
CO2 SERPL-SCNC: 24 MMOL/L (ref 20–33)
CREAT SERPL-MCNC: 0.75 MG/DL (ref 0.5–1.4)
FASTING STATUS PATIENT QL REPORTED: NORMAL
GFR SERPLBLD CREATININE-BSD FMLA CKD-EPI: 99 ML/MIN/1.73 M 2
GLOBULIN SER CALC-MCNC: 2.7 G/DL (ref 1.9–3.5)
GLUCOSE SERPL-MCNC: 88 MG/DL (ref 65–99)
HDLC SERPL-MCNC: 92 MG/DL
LDLC SERPL CALC-MCNC: 141 MG/DL
POTASSIUM SERPL-SCNC: 4.5 MMOL/L (ref 3.6–5.5)
PROT SERPL-MCNC: 7.7 G/DL (ref 6–8.2)
SODIUM SERPL-SCNC: 141 MMOL/L (ref 135–145)
TRIGL SERPL-MCNC: 78 MG/DL (ref 0–149)

## 2022-11-12 PROCEDURE — 80053 COMPREHEN METABOLIC PANEL: CPT

## 2022-11-12 PROCEDURE — 80061 LIPID PANEL: CPT

## 2022-11-12 PROCEDURE — 36415 COLL VENOUS BLD VENIPUNCTURE: CPT

## 2022-12-13 RX ORDER — TRAZODONE HYDROCHLORIDE 50 MG/1
50 TABLET ORAL NIGHTLY
Qty: 30 TABLET | Refills: 2 | Status: SHIPPED | OUTPATIENT
Start: 2022-12-13

## 2022-12-13 NOTE — TELEPHONE ENCOUNTER
Received request via: Patient    Was the patient seen in the last year in this department? Yes    Does the patient have an active prescription (recently filled or refills available) for medication(s) requested? No    Does the patient have [facility] and need 100 day supply (blood pressure, diabetes and cholesterol meds only)? Patient does not have [facility]

## 2023-08-25 ENCOUNTER — TELEPHONE (OUTPATIENT)
Dept: HEALTH INFORMATION MANAGEMENT | Facility: OTHER | Age: 48
End: 2023-08-25

## 2024-02-19 ENCOUNTER — TELEPHONE (OUTPATIENT)
Dept: HEALTH INFORMATION MANAGEMENT | Facility: OTHER | Age: 49
End: 2024-02-19
Payer: COMMERCIAL